# Patient Record
Sex: MALE | URBAN - METROPOLITAN AREA
[De-identification: names, ages, dates, MRNs, and addresses within clinical notes are randomized per-mention and may not be internally consistent; named-entity substitution may affect disease eponyms.]

---

## 2019-01-28 ENCOUNTER — HOSPITAL ENCOUNTER (INPATIENT)
Facility: HOSPITAL | Age: 69
LOS: 7 days | Discharge: NURSING, REHAB OR LONG-TERM CARE | DRG: 897 | End: 2019-02-04
Attending: PSYCHIATRY & NEUROLOGY | Admitting: PSYCHIATRY & NEUROLOGY
Payer: MEDICARE

## 2019-01-28 DIAGNOSIS — G20 PARKINSON DISEASE (HCC): ICD-10-CM

## 2019-01-28 DIAGNOSIS — F39 MOOD DISORDER (HCC): Primary | ICD-10-CM

## 2019-01-28 PROCEDURE — 1123F ACP DISCUSS/DSCN MKR DOCD: CPT | Performed by: PSYCHIATRY & NEUROLOGY

## 2019-01-28 PROCEDURE — 99222 1ST HOSP IP/OBS MODERATE 55: CPT | Performed by: PHYSICIAN ASSISTANT

## 2019-01-28 RX ORDER — CARBIDOPA/LEVODOPA 25MG-250MG
1 TABLET ORAL
Status: DISCONTINUED | OUTPATIENT
Start: 2019-01-28 | End: 2019-01-30

## 2019-01-28 RX ORDER — LORAZEPAM 1 MG/1
1 TABLET ORAL EVERY 4 HOURS PRN
Status: DISCONTINUED | OUTPATIENT
Start: 2019-01-28 | End: 2019-01-29

## 2019-01-28 RX ORDER — ASPIRIN 81 MG/1
81 TABLET ORAL DAILY
Status: DISCONTINUED | OUTPATIENT
Start: 2019-01-29 | End: 2019-02-04 | Stop reason: HOSPADM

## 2019-01-28 RX ORDER — ACETAMINOPHEN 325 MG/1
650 TABLET ORAL EVERY 4 HOURS PRN
Status: DISCONTINUED | OUTPATIENT
Start: 2019-01-28 | End: 2019-01-31

## 2019-01-28 RX ORDER — OLANZAPINE 10 MG/1
10 INJECTION, POWDER, LYOPHILIZED, FOR SOLUTION INTRAMUSCULAR 2 TIMES DAILY PRN
Status: DISCONTINUED | OUTPATIENT
Start: 2019-01-28 | End: 2019-01-29

## 2019-01-28 RX ORDER — CARBIDOPA/LEVODOPA 25MG-250MG
1 TABLET ORAL
Status: ON HOLD | COMMUNITY
End: 2019-01-30 | Stop reason: CLARIF

## 2019-01-28 RX ORDER — ENTACAPONE 200 MG/1
200 TABLET ORAL
Status: ON HOLD | COMMUNITY
End: 2019-01-30 | Stop reason: CLARIF

## 2019-01-28 RX ORDER — BENZTROPINE MESYLATE 1 MG/1
1 TABLET ORAL EVERY 6 HOURS PRN
Status: DISCONTINUED | OUTPATIENT
Start: 2019-01-28 | End: 2019-02-04 | Stop reason: HOSPADM

## 2019-01-28 RX ORDER — ENTACAPONE 200 MG/1
200 TABLET ORAL
Status: DISCONTINUED | OUTPATIENT
Start: 2019-01-28 | End: 2019-01-30

## 2019-01-28 RX ORDER — ASPIRIN 81 MG/1
81 TABLET ORAL DAILY
COMMUNITY

## 2019-01-28 RX ORDER — RISPERIDONE 1 MG/1
1 TABLET, FILM COATED ORAL EVERY 8 HOURS PRN
Status: DISCONTINUED | OUTPATIENT
Start: 2019-01-28 | End: 2019-01-29

## 2019-01-28 RX ORDER — HALOPERIDOL 5 MG
5 TABLET ORAL EVERY 8 HOURS PRN
Status: DISCONTINUED | OUTPATIENT
Start: 2019-01-28 | End: 2019-01-29

## 2019-01-28 RX ORDER — BENZTROPINE MESYLATE 1 MG/ML
1 INJECTION INTRAMUSCULAR; INTRAVENOUS EVERY 6 HOURS PRN
Status: DISCONTINUED | OUTPATIENT
Start: 2019-01-28 | End: 2019-02-04 | Stop reason: HOSPADM

## 2019-01-28 RX ORDER — TRAZODONE HYDROCHLORIDE 50 MG/1
25 TABLET ORAL
Status: DISCONTINUED | OUTPATIENT
Start: 2019-01-28 | End: 2019-02-04 | Stop reason: HOSPADM

## 2019-01-28 RX ORDER — MAGNESIUM HYDROXIDE/ALUMINUM HYDROXICE/SIMETHICONE 120; 1200; 1200 MG/30ML; MG/30ML; MG/30ML
30 SUSPENSION ORAL EVERY 4 HOURS PRN
Status: DISCONTINUED | OUTPATIENT
Start: 2019-01-28 | End: 2019-02-04 | Stop reason: HOSPADM

## 2019-01-28 RX ADMIN — CARBIDOPA AND LEVODOPA 1 TABLET: 25; 250 TABLET ORAL at 19:50

## 2019-01-28 RX ADMIN — OLANZAPINE 10 MG: 10 INJECTION, POWDER, FOR SOLUTION INTRAMUSCULAR at 22:04

## 2019-01-28 RX ADMIN — LORAZEPAM 1 MG: 1 TABLET ORAL at 23:25

## 2019-01-28 RX ADMIN — ACETAMINOPHEN 650 MG: 325 TABLET ORAL at 19:27

## 2019-01-28 RX ADMIN — TRAZODONE HYDROCHLORIDE 25 MG: 50 TABLET ORAL at 23:24

## 2019-01-28 NOTE — PROGRESS NOTES
Pt is a 201 admission from [facility]  Pt admitted for increased anxiety, depression, SI, paranoid delusions that he is poisoned by staff  Pt states he told a worker at [facility] that "if you had Parkinson's you would want to be dead too"  Staff reported that pt threatened to stab himself in the chest with a knife  Pt denies SI, depression on admission, he does report anxiety  Pt currently lives at [facility], he was admitted there due to concerns he was overtaking his Sinement  Pt has a PMH of CAD, Depression, Syphilis, Back pain, HTN, Parkinson's disease, hernia repair and mitral valve prolapse

## 2019-01-28 NOTE — PLAN OF CARE
Anxiety     Anxiety is at manageable level Progressing        Risk for Self Injury/Neglect     Treatment Goal: Remain safe during length of stay, learn and adopt new coping skills, and be free of self-injurious ideation, impulses and acts at the time of discharge Progressing

## 2019-01-29 PROBLEM — R45.851 SUICIDAL IDEATION: Status: ACTIVE | Noted: 2019-01-29

## 2019-01-29 PROBLEM — F19.959 SUBSTANCE OR MEDICATION-INDUCED PSYCHOTIC DISORDER (HCC): Status: ACTIVE | Noted: 2019-01-29

## 2019-01-29 LAB
ATRIAL RATE: 80 BPM
CHOLEST SERPL-MCNC: 221 MG/DL
EST. AVERAGE GLUCOSE BLD GHB EST-MCNC: 117 MG/DL
HBA1C MFR BLD: 5.7 % (ref 4.2–6.3)
HDLC SERPL-MCNC: 63 MG/DL (ref 40–59)
LDLC SERPL CALC-MCNC: 143 MG/DL
NONHDLC SERPL-MCNC: 158 MG/DL
P AXIS: 58 DEGREES
PLATELET # BLD AUTO: 181 THOUSANDS/UL (ref 150–450)
PR INTERVAL: 172 MS
QRS AXIS: 5 DEGREES
QRSD INTERVAL: 92 MS
QT INTERVAL: 386 MS
QTC INTERVAL: 445 MS
T WAVE AXIS: 20 DEGREES
TRIGL SERPL-MCNC: 75 MG/DL
TSH SERPL DL<=0.05 MIU/L-ACNC: 3.76 UIU/ML (ref 0.47–4.68)
VENTRICULAR RATE: 80 BPM

## 2019-01-29 PROCEDURE — 85049 AUTOMATED PLATELET COUNT: CPT | Performed by: PHYSICIAN ASSISTANT

## 2019-01-29 PROCEDURE — 93010 ELECTROCARDIOGRAM REPORT: CPT | Performed by: INTERNAL MEDICINE

## 2019-01-29 PROCEDURE — 93005 ELECTROCARDIOGRAM TRACING: CPT

## 2019-01-29 PROCEDURE — 80061 LIPID PANEL: CPT | Performed by: PHYSICIAN ASSISTANT

## 2019-01-29 PROCEDURE — 83036 HEMOGLOBIN GLYCOSYLATED A1C: CPT | Performed by: PHYSICIAN ASSISTANT

## 2019-01-29 PROCEDURE — 84443 ASSAY THYROID STIM HORMONE: CPT | Performed by: PHYSICIAN ASSISTANT

## 2019-01-29 PROCEDURE — 99222 1ST HOSP IP/OBS MODERATE 55: CPT | Performed by: PSYCHIATRY & NEUROLOGY

## 2019-01-29 RX ORDER — CHLORPROMAZINE HYDROCHLORIDE 25 MG/ML
25 INJECTION INTRAMUSCULAR EVERY 2 HOUR PRN
Status: DISCONTINUED | OUTPATIENT
Start: 2019-01-29 | End: 2019-02-04 | Stop reason: HOSPADM

## 2019-01-29 RX ADMIN — RISPERIDONE 1 MG: 1 TABLET ORAL at 02:50

## 2019-01-29 RX ADMIN — CARBIDOPA AND LEVODOPA 1 TABLET: 25; 250 TABLET ORAL at 14:37

## 2019-01-29 RX ADMIN — CARBIDOPA AND LEVODOPA 1 TABLET: 25; 250 TABLET ORAL at 11:29

## 2019-01-29 RX ADMIN — CARBIDOPA AND LEVODOPA 1 TABLET: 25; 250 TABLET ORAL at 06:22

## 2019-01-29 RX ADMIN — OLANZAPINE 10 MG: 10 INJECTION, POWDER, FOR SOLUTION INTRAMUSCULAR at 04:38

## 2019-01-29 RX ADMIN — WATER 10 ML: 1 INJECTION INTRAMUSCULAR; INTRAVENOUS; SUBCUTANEOUS at 04:42

## 2019-01-29 RX ADMIN — CARBIDOPA AND LEVODOPA 1 TABLET: 25; 250 TABLET ORAL at 17:29

## 2019-01-29 RX ADMIN — CARBIDOPA AND LEVODOPA 1 TABLET: 25; 250 TABLET ORAL at 01:11

## 2019-01-29 RX ADMIN — ENTACAPONE 200 MG: 200 TABLET, FILM COATED ORAL at 01:15

## 2019-01-29 RX ADMIN — ENTACAPONE 200 MG: 200 TABLET, FILM COATED ORAL at 06:22

## 2019-01-29 NOTE — PLAN OF CARE
Problem: Ineffective Coping  Goal: Participates in unit activities  Interventions:  - Provide therapeutic environment   - Provide required programming   - Redirect inappropriate behaviors    Outcome: Not Progressing  Pt not engaged in groups today

## 2019-01-29 NOTE — PROGRESS NOTES
Patient in chair in small TV room with staff present  Got up out of chair and and fell backward on bottom  Patient stood without assistance and walked to chair  Modesto EDMOND notified and on unit to assess patient
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
## 2019-01-29 NOTE — CASE MANAGEMENT
Pt comes to us from [facility] [address]  [phone]  I have called them to ask  if pt has any family that is involved; a POA; is he a resident of that site and is he welcome to return? I will await their call back

## 2019-01-29 NOTE — UTILIZATION REVIEW
I called and spoke with Will at [facility] at [phone] and advised them of patient's arrival on unit  Indicated we were not showing in their system, that an authorization number wouldn't show until discharge since Medicare/Medicaid is primary

## 2019-01-29 NOTE — CASE MANAGEMENT
Received a call from Robert Guo, the  at [facility] facility  [phone] and she states this pt is welcome to return once psychiatrically stable  Met with pt and he signed the IMM rights and his treatment plan  He states he wants to return to [facility]  He signed J[facility]ON for [facility], [employer]  Pt reports he is , has two sons- Carole Vizcarra and Zohra Rico- but could not elaborate any further  His speech is garbled, but he was able to state the correct date

## 2019-01-29 NOTE — CONSULTS
Consult- Torin Paredes 1950, 80 y o  male MRN: [id number]    Unit/Bed#: [facility] 355-74 Encounter: [id number]    Primary Care Provider: No primary care provider on file  Date and time admitted to hospital: 1/28/2019  5:03 PM      Inpatient consult for Medical Clearance for [address] patient  Consult performed by: Demarcus Hutton ordered by: Trenia Holy        Suicidal ideation   Assessment & Plan    · Patient is a transfer from [facility] due to increasing suicidal ideation, paranoia, anxiety and depression  Made comments that he would stab himself in the chest with a knife  Denies further SI currently but is agitated following the admission process  · Vital signs stable at time of admission  · Lab work results obtained from paper work sent with patient as information not available through EPIC  · CBC and CMP WNL  · UA negative  · UDS negative  · Did not see EKG with documentation - will order for AM as well as TSH  · Plan per psychiatry     Parkinson disease [facility])   Assessment & Plan    · Continue caribdopa-levodopa, administer with entacapone  · Fall precautions         VTE Prophylaxis: Enoxaparin (Lovenox)  / reason for no mechanical VTE prophylaxis Psychiatric unit, ambulate     Recommendations for Discharge:  · Follow-up with PCP at time of discharge    Counseling / Coordination of Care Time: 30 minutes  Greater than 50% of total time spent on patient counseling and coordination of care  Collaboration of Care: Were Recommendations Directly Discussed with Primary Treatment Team? - No     History of Present Illness:    Torin Paredes is a 80 y o  male who is originally admitted to the psychiatry service due to suicidal ideations  We are consulted for medical clearance  Past medical history significant for Parkinson's disease and mood disorder  Patient is a poor historian, and is agitated during encounter    Is here on a 201, but is adamant that he does not belong here and that nobody can even do basic comprehension here"  Is irritated that the string had to be removed from his sweat pants, and fixates on that during conversation  Current denies any physical complaints including chest pain/palpitations, shortness of breath, nausea/vomiting, or abdominal pain  Denies any current suicidal ideations  Review of Systems:    Review of Systems   Constitutional: Negative for chills, fatigue, fever and unexpected weight change  HENT: Negative for congestion, sore throat and trouble swallowing  Eyes: Negative for photophobia, pain and visual disturbance  Respiratory: Negative for cough, shortness of breath and wheezing  Cardiovascular: Negative for chest pain, palpitations and leg swelling  Gastrointestinal: Negative for abdominal pain, constipation, diarrhea, nausea and vomiting  Endocrine: Negative for polyuria  Genitourinary: Negative for difficulty urinating, dysuria, flank pain, hematuria and urgency  Musculoskeletal: Negative for back pain, myalgias, neck pain and neck stiffness  Skin: Negative for pallor and rash  Neurological: Negative for dizziness, tremors, syncope, speech difficulty, weakness, light-headedness and headaches  Hematological: Does not bruise/bleed easily  Psychiatric/Behavioral: Positive for agitation and decreased concentration  Negative for confusion  Past Medical and Surgical History:     Past Medical History:   Diagnosis Date    Mitral valve prolapse     Parkinson's disease ([facility] [address] )        No past surgical history on file  Meds/Allergies:    all medications and allergies reviewed    Allergies: No Known Allergies    Social History:     Marital Status: Unknown    Substance Use History:   History   Alcohol Use No     History   Smoking Status    Former Smoker    Packs/day: 1 00    Quit date: 1/1/2018   Smokeless Tobacco    Never Used     History   Drug Use    Types: Heroin       Family History:    History reviewed   No pertinent family history  Physical Exam:     Vitals:   Blood Pressure: 152/66 (01/28/19 2214)  Pulse: 79 (01/28/19 2214)  Temperature: (!) 97 1 °F (36 2 °C) (01/28/19 2214)  Temp Source: Tympanic (01/28/19 2214)  Respirations: 18 (01/28/19 2214)  Height: 5' 9" (175 3 cm) (01/28/19 1716)  Weight - Scale: 71 kg (156 lb 8 4 oz) (01/28/19 1716)  SpO2: 98 % (01/28/19 2214)    Physical Exam   Constitutional: He is oriented to person, place, and time  Vital signs are normal  He appears well-developed  Masked facies secondary Parkinson's disease   HENT:   Head: Normocephalic  Eyes: Pupils are equal, round, and reactive to light  EOM are normal  No scleral icterus  Neck: Normal range of motion  Cardiovascular: Normal rate, regular rhythm and normal heart sounds  No murmur heard  Pulmonary/Chest: Effort normal and breath sounds normal  No respiratory distress  Abdominal: Soft  Bowel sounds are normal  There is no tenderness  Musculoskeletal: He exhibits no edema  Neurological: He is alert and oriented to person, place, and time  Skin: Skin is warm and dry  Psychiatric: His affect is blunt  His speech is tangential  He is agitated  Cognition and memory are impaired  Nursing note and vitals reviewed  Additional Data:     Lab Results: I have personally reviewed pertinent reports  No results found for: HGBA1C            Imaging: I have personally reviewed pertinent reports  No orders to display       EKG, Pathology, and Other Studies Reviewed on Admission:   · EKG:  Obtain an a m     ** Please Note: This note has been constructed using a voice recognition system   **

## 2019-01-29 NOTE — PROGRESS NOTES
Pt currently sleeping in bed with bed alarm in place  Medications held at this time secondary to pt sleeping  Will continue to monitor
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
## 2019-01-29 NOTE — PROGRESS NOTES
Patient unable to sleep  Sitting in chair in hallway, restless; picking in air; attempting to get up  Agitation and aggression improved

## 2019-01-29 NOTE — PROGRESS NOTES
Patient irritable and agitated  Aggressive toward staff; attempting to get out of chair and kick staff  Swinging at staff and trying to grab staff  Medicated with prn zyprexa

## 2019-01-29 NOTE — PROGRESS NOTES
Restless, impulsive, poor safety awareness  Unsteady gait  Attempted verbal redirection, unsuccessful  Placed on 1:1 observation while awake for safety at this time secondary to high fall risk and previous fall this admission  Will continue to monitor and attempt redirection

## 2019-01-29 NOTE — PROGRESS NOTES
Patient restless any yelling out  Saying he wants to be in the ground with his wife and son; Asking if anyone here does assisted suicide  States he is not tired and does not want to sleep  Medicated with trazodone and ativan but continues to yell out, shake side rails and tries to get OOB

## 2019-01-29 NOTE — ASSESSMENT & PLAN NOTE
· Patient is a transfer from [facility] due to increasing suicidal ideation, paranoia, anxiety and depression  Made comments that he would stab himself in the chest with a knife  Denies further SI currently but is agitated following the admission process  · Vital signs stable at time of admission     · Lab work results obtained from paper work sent with patient as information not available through EPIC  · CBC and CMP WNL  · UA negative  · UDS negative  · Did not see EKG with documentation - will order for AM as well as TSH  · Plan per psychiatry

## 2019-01-29 NOTE — PROGRESS NOTES
Notified per nursing that patient had witnessed fall  Patient had been agitated throughout the evening, MHT had witnessed fall  Appears to be mechanical fall as patient is unsteady on his feet, does not ambulate with any assisted devices at this time  Extremely irritable and overall making physical examination limited  Patient reportedly fell backwards on to his buttocks, did not hit his head  Is not on a blood thinner  Currently denying any and all pain; grows more agitated with questioning  Was able to ambulate to chair near nursing station after fall without particular difficulty but is unsteady on his feet  Vitals:  Stable from prior    Physical Exam:  · General:  Appears in no acute medical distress, is very agitated and aggressive  Becomes more agitated with further questioning  Is awake and alert  · HEENT:  Head normocephalic, atraumatic  PERRLA  · Cardiac:  RRR, no murmurs rubs gallops  · Resp:  Clear to auscultation bilaterally  · Abd: non-tender to palpation  · Msk:  Limited due to patient's agitation, not willing to participate in a full examination  Denies any pain, is able to move both lower extremities without difficulty  Patient had been able to get up off the floor without assistance and walk to the chair after the fall  Refuses skin assessment    · Neuro:  Grossly intact, unsteady on feet which had been noted during admission process    Plan:  · Overall physical examination grossly normal; will hold off on imaging at this time unless patient becomes symptomatic   · Fall precautions, close observation  · Will have PT evaluate

## 2019-01-29 NOTE — PROGRESS NOTES
Patient in chair in small TV room, requested and given snack then assisted to bed  Patient restless, attempting to get OOB, unable to redirect  Risperdal ineffective

## 2019-01-30 PROCEDURE — 97530 THERAPEUTIC ACTIVITIES: CPT

## 2019-01-30 PROCEDURE — G8979 MOBILITY GOAL STATUS: HCPCS

## 2019-01-30 PROCEDURE — 97116 GAIT TRAINING THERAPY: CPT

## 2019-01-30 PROCEDURE — 97163 PT EVAL HIGH COMPLEX 45 MIN: CPT

## 2019-01-30 PROCEDURE — 99232 SBSQ HOSP IP/OBS MODERATE 35: CPT | Performed by: PSYCHIATRY & NEUROLOGY

## 2019-01-30 PROCEDURE — G8978 MOBILITY CURRENT STATUS: HCPCS

## 2019-01-30 RX ORDER — CARBIDOPA/LEVODOPA 25MG-250MG
1 TABLET ORAL 4 TIMES DAILY
Status: DISCONTINUED | OUTPATIENT
Start: 2019-01-30 | End: 2019-01-31

## 2019-01-30 RX ORDER — CARBIDOPA, LEVODOPA AND ENTACAPONE 37.5; 200; 15 MG/1; MG/1; MG/1
1 TABLET, FILM COATED ORAL 4 TIMES DAILY
COMMUNITY
End: 2019-02-04 | Stop reason: HOSPADM

## 2019-01-30 RX ADMIN — CARBIDOPA AND LEVODOPA 1 TABLET: 25; 250 TABLET ORAL at 15:45

## 2019-01-30 RX ADMIN — ACETAMINOPHEN 650 MG: 325 TABLET ORAL at 17:37

## 2019-01-30 RX ADMIN — CARBIDOPA AND LEVODOPA 1 TABLET: 25; 250 TABLET ORAL at 19:24

## 2019-01-30 RX ADMIN — ENTACAPONE 200 MG: 200 TABLET, FILM COATED ORAL at 06:24

## 2019-01-30 RX ADMIN — ENTACAPONE 200 MG: 200 TABLET, FILM COATED ORAL at 09:18

## 2019-01-30 RX ADMIN — CARBIDOPA AND LEVODOPA 1 TABLET: 25; 250 TABLET ORAL at 06:24

## 2019-01-30 RX ADMIN — CARBIDOPA AND LEVODOPA 1 TABLET: 25; 250 TABLET ORAL at 09:18

## 2019-01-30 RX ADMIN — ASPIRIN 81 MG: 81 TABLET, COATED ORAL at 09:18

## 2019-01-30 RX ADMIN — ACETAMINOPHEN 650 MG: 325 TABLET ORAL at 09:18

## 2019-01-30 NOTE — PROGRESS NOTES
Pt is cooperative with care and is medication compliant  Pt advised feeling better today  Pt brightens upon approach, is visible in the milieu and out for groups  Pt came to the nurses station, advised he was pleased with how much better he feels in a short time  Pt denies s/s at this time  Will continue to monitor

## 2019-01-30 NOTE — PHYSICAL THERAPY NOTE
Physical Therapy Evaluation and Treatment Note    Time In/Out: 8416-7379    Patient's Name: Lupis Hernandez    Admitting Diagnosis  Depression [F32 9]    Problem List  Patient Active Problem List   Diagnosis    Parkinson disease ([facility] )    Suicidal ideation    Substance or medication-induced psychotic disorder [facility])       Past Medical History  Past Medical History:   Diagnosis Date    Mitral valve prolapse     Parkinson's disease ([facility] )        Past Surgical History  No past surgical history on file  01/30/19 0059-9169   Note Type   Note type Eval/Treat   Pain Assessment   Pain Assessment 0-10   Pain Score 5   Pain Type Chronic pain   Pain Location Leg   Pain Orientation Bilateral   Pain Descriptors Tightness   Pain Frequency Constant/continuous   Pain Onset Ongoing   Clinical Progression Not changed   Hospital Pain Intervention(s) Medication (See MAR); Repositioned; Ambulation/increased activity   Home Living   Type of Home ([address] [facility])   Home Layout One level   Home Equipment Walker;Cane;Wheelchair-manual  (RW)   Prior Function   Level of Person Needs assistance with ADLs and functional mobility   Receives Help From ([address] [place] staff)   ADL Assistance Needs assistance   IADLs Needs assistance   Falls in the last 6 months 0   Vocational Retired   Restrictions/Precautions   [facility] Bearing Precautions Per Order No   Other Precautions Suicidal;Cognitive; Fall Risk   General   Family/Caregiver Present No   Cognition   Overall Cognitive Status Impaired   Arousal/Participation Alert   Orientation Level Oriented X4   Memory Decreased long term memory   Following Commands Follows one step commands without difficulty   RUE Assessment   RUE Assessment WFL   LUE Assessment   LUE Assessment WFL   RLE Assessment   RLE Assessment WFL   LLE Assessment   LLE Assessment WFL   Coordination   Movements are Fluid and Coordinated 0   Coordination and Movement Description (freezing gait)   Sensation WFL   Light Touch   RLE Light Touch Grossly intact   LLE Light Touch Grossly intact   Bed Mobility   Rolling R 5  Supervision   Additional items Verbal cues   Rolling L 5  Supervision   Additional items Verbal cues   Supine to Sit 5  Supervision   Additional items Increased time required;Verbal cues   Sit to Supine 5  Supervision   Additional items Increased time required;Verbal cues   Transfers   Sit to Stand 5  Supervision   Additional items Armrests; Increased time required;Verbal cues   Stand to Sit 5  Supervision   Additional items Armrests; Increased time required;Verbal cues   Stand pivot 5  Supervision   Additional items Armrests; Increased time required;Verbal cues  (RW)   Additional Comments (Transfer surfaces: edge of bed, armchair)   Ambulation/Elevation   Gait pattern Forward Flexion;Decreased foot clearance;Shuffling; Short stride  (shuffling gait with freezing in doorways & narrow spaces)   Gait Assistance 5  Supervision   Additional items Verbal cues; Tactile cues   Assistive Device Rolling walker   Distance (~ 200 feet x 1)   Balance   Static Sitting Good   Dynamic Sitting (Good -)   Static Standing Fair +  (with RW)   Dynamic Standing Fair  (with RW)   Ambulatory Fair  (with RW)   Endurance Deficit   Endurance Deficit Yes   Activity Tolerance   Activity Tolerance Patient tolerated treatment well;Patient limited by fatigue  (pt c/o brain fog)   Nurse Made Aware (RN cleared for evaluation)   Assessment   Prognosis Good   Problem List Impaired balance;Decreased mobility; Decreased coordination;Decreased cognition; Impaired judgement;Decreased safety awareness;Pain   Assessment Faby Dale is a 80 y o  Male who was admitted to [address] [place] [address] on 1/28/19 with a diagnosis of substance or medication-induced psychotic disorder  Prior to [facility] to [facility], pt was admitted to [address] for long-term care on 1/11/19 and previously lived at a similar facility  Per chart, his gait was very limited at baseline   PMH significant for Parkinson's disease, and suicidal ideation  Pt seated in community room at time of evaluation  Pt presents with stooped/forward flexed posture, masked face (pt noted that his mouth is always open), and bilateral UE and LE resting tremor  Pt noted to have multiple freezing episodes with sit <-> stand transfer, ambulation through doorways and narrow spaces, turning, and with bed mobility impacting pt's functional mobility and safety  External verbal cues to increase step length with ambulation and tactile cues/rocking in place decreased shuffling/freezing, but internal cueing was ineffective  Vitals: resting in chair /70, HR 67 bpm, SPO2 (RA) 98%; seated after 200' x 1 ambulation with RW /79, HR 69 bpm, SPO2 (RA) 99%, seated edge of bed after short rest /77, HR 69 bpm, SPO2 (RA) 98%  Pt would benefit from skilled physical therapy services to address mobility deficits related to Parkinson's disease and improve pt's safety in prep for return to nursing home  In summary the patient's history, examination of body system(s), activity limitations, participation restrictions, and collaboration of care are the guiding factors that were used to determine the clinical descion  The clinical presentation is unstable and therefore the assigned level of complexity is: high  Barriers to Discharge (none if returning to [facility])   Goals   Patient Goals ("To get better  ")   STG Expiration Date 02/13/19   Short Term Goal #1 1  Pt will perform all bed mobility mod I using compensatory techniques to decrease freezing episodes in order to manage at home  2  Pt will perform all functional transfers mod I with RW using compensatory techniques to decrease freezing episodes in order to safely transfer from one surface to another  3  Pt will ambulate > or = 200 feet with least restrictive AD using compensatory techniques/verbal cues to decrease freezing and shuffling gait to allow pt to safely ambulate within home 4   Pt will perform 180 and 360 turns with RW, CGA, and compensatory techniques/verbal cues to decrease shuffling gait with turns and increase safety with ambulation  Treatment Day 1   Plan   Treatment/Interventions Functional transfer training; Therapeutic exercise; Endurance training;Cognitive reorientation;Patient/family training;Equipment eval/education; Bed mobility;Gait training; Compensatory technique education;Continued evaluation;Spoke to MD;Spoke to nursing;Spoke to case management;Spoke to advanced practitioner;Family   PT Frequency 2-3x/wk   Recommendation   Recommendation Long-term skilled nursing home placement   Equipment Recommended (pt appears to own all necessary DME)   PT - OK to Discharge Yes  (if returning to SNF)   Barthel Index   Feeding 10   Bathing 0   Grooming Score 5   Dressing Score 5   Bladder Score 5   Bowels Score 10   Toilet Use Score 5   Transfers (Bed/Chair) Score 10   Mobility (Level Surface) Score 10   Stairs Score 0   Barthel Index Score 60     ________________________________________________________    PT Treatment Note    Time In/Out: 9551-0395    S: Pt stated that he is moving better today but had a lot of difficulty 2 days ago  O: Gait training in hallway using auditory cues to facilitate increased step length/decrease shuffling  Sit <-> stand transfer training with verbal/tactile cues and therapist demonstration to decrease freezing with sit to stand and improve eccentric control with stand to sit  A: Pt presents with postural instability,impaired balance, shuffling gait, and freezing episodes  Would benefit from continued skilled PT while on OABHU to improve functional mobility and maximize safety  P: Continue skilled PT per POC        Magali Saldaña, PT

## 2019-01-30 NOTE — PLAN OF CARE
Problem: Ineffective Coping  Goal: Participates in unit activities  Interventions:  - Provide therapeutic environment   - Provide required programming   - Redirect inappropriate behaviors    Outcome: Progressing  Pt attended both morning groups with active task engagement and expressed appreciation for the sessions and for feeling progressively better today

## 2019-01-30 NOTE — PLAN OF CARE
Problem: PHYSICAL THERAPY ADULT  Goal: Performs mobility at highest level of function for planned discharge setting  See evaluation for individualized goals  Treatment/Interventions: Functional transfer training, Therapeutic exercise, Endurance training, Cognitive reorientation, Patient/family training, Equipment eval/education, Bed mobility, Gait training, Compensatory technique education, Continued evaluation, Spoke to MD, Spoke to nursing, Spoke to case management, Spoke to advanced practitioner, Family  Equipment Recommended:  (pt appears to own all necessary DME)       See flowsheet documentation for full assessment, interventions and recommendations  Outcome: Progressing  Prognosis: Good  Problem List: Impaired balance, Decreased mobility, Decreased coordination, Decreased cognition, Impaired judgement, Decreased safety awareness, Pain  Assessment: Elly Garay is a 80 y o  Male who was admitted to [address] [place] [address] on 1/28/19 with a diagnosis of substance or medication-induced psychotic disorder  Prior to admission to [facility], pt was admitted to [address] for long-term care on 1/11/19 and previously lived at a similar facility  Per chart, his gait was very limited at baseline  PMH significant for Parkinson's disease, and suicidal ideation  Pt seated in community room at time of evaluation  Pt presents with stooped/forward flexed posture, masked face (pt noted that his mouth is always open), and bilateral UE and LE resting tremor  Pt noted to have multiple freezing episodes with sit <-> stand transfer, ambulation through doorways and narrow spaces, turning, and with bed mobility impacting pt's functional mobility and safety  External verbal cues to increase step length with ambulation and tactile cues/rocking in place decreased shuffling/freezing, but internal cueing was ineffective   Vitals: resting in chair /70, HR 67 bpm, SPO2 (RA) 98%; seated after 200' x 1 ambulation with RW /79, HR 69 bpm, SPO2 (RA) 99%, seated edge of bed after short rest /77, HR 69 bpm, SPO2 (RA) 98%  Pt would benefit from skilled physical therapy services to address mobility deficits related to Parkinson's disease and improve pt's safety in prep for return to nursing home  In summary the patient's history, examination of body system(s), activity limitations, participation restrictions, and collaboration of care are the guiding factors that were used to determine the clinical descion  The clinical presentation is unstable and therefore the assigned level of complexity is: high  Barriers to Discharge:  (none if returning to [facility])     Recommendation: Long-term skilled nursing home placement     PT - OK to Discharge: Yes (if returning to SNF)    See flowsheet documentation for full assessment

## 2019-01-30 NOTE — PROGRESS NOTES
Writer called patient's outpatient neurologist Dr Lachelle Quesada with [facility] in [place]  No return call  Discussed case with Dr David Bourne in neurology on the consult service here recommended discontinuing entacapone and continue sinemet 25-250mg five times daily  Will continue to try to reach out to Dr Lachelle Quesada in the meantime

## 2019-01-30 NOTE — PROGRESS NOTES
[facility] Inpatient Geriatric Psychiatry  Psychiatrists  Progress Note    Patient Name: Ivet Burleson  MRN: [id number]  DOS: 01/30/19     Chief Complaint:  psychosis    Interval History: Per staff, patient patient been less sedated, ambulating well, calm, cooperative  Patient reports feeling better  He is much less dysarthric  He is well oriented  He explained that he has been at the current NH for only about 2 weeks  Prior to that he was at another NH for 2 weeks  He states he could not longer take care of himself with his parkinson disease after his wife passed away  He believes he should continue to live in the current facility for the time being  He is focused on increasing sinemet, wanting to take it every 2 hours or so  He states he was recently started on a combination medication with entacapone but that this has made him feel "foggy " He acknowledges the events and his behavior leading up to this admission are due to the medication change  Writer spoke with a nurse at the NH today who corroborates patient's report  She states he was on levodopa-carbidopa 25-250mg po TID from 1/12/19-1/23/19  He then was seen in neurology clinic (Dr Serenity Otto at [facility]) and was switched to levodopa-carbidopa-entacapone 37 [id number] po QID starting 1/25/19  Additionally, he was started on seroquel 25mg po qhs 1/16/19 for "restlessness" by a psychiatrist at the [facility] [place] but patient started refusing after a few days stating neurologists have told him not to take this medication  It should be noted that patient's current regimen of levodopa-carbidopa 25-250mg po five times daily and entacapone 200mg five times daily was started by the hospitalist service based on the home med list available at the time of admission  It's unclear how this was reconciled   Nevertheless, we have had no recurrence of psychosis, suicidal thoughts or agitation and patient is ambulating moderate well with a walker  Medication compliant  Adverse effects of medications: denies      Mental Status Exam [Per above +]  Appearance: some overgrown facial hair; dressed in hospital gown; fair hygiene; no tremors noted; moderately stable gait with walker; mild shuffling and truncal stiffness  Behavior: calm, cooperative, attentive  Speech: very mildly dysarthric  Mood: reported as euthymic  Affect: neutral, stable, somewhat constricted  Thought process: linear, largely logical  Thought content: no delusions elicited; denies SI/HI  Perceptual disturbances: denies AH/VH  Cognition: oriented to all domains   No gross cognitive deficits based on his ability to recall medication names, recent events consistent with collateral information  Insight: intact  Judgement: improved      Current Facility-Administered Medications:     acetaminophen (TYLENOL) tablet 650 mg, 650 mg, Oral, Q4H PRN, Myra Lester MD, 650 mg at 01/30/19 0918    aluminum-magnesium hydroxide-simethicone (MYLANTA) 200-200-20 mg/5 mL oral suspension 30 mL, 30 mL, Oral, Q4H PRN, Myra Lester MD    aspirin (ECOTRIN LOW STRENGTH) EC tablet 81 mg, 81 mg, Oral, Daily, Joe Leblanc PA-C, 81 mg at 01/30/19 0441    benztropine (COGENTIN) injection 1 mg, 1 mg, Intramuscular, Q6H PRN, Myra Lester MD    benztropine (COGENTIN) tablet 1 mg, 1 mg, Oral, Q6H PRN, Myra Lester MD    carbidopa-levodopa (SINEMET)  mg per tablet 1 tablet, 1 tablet, Oral, 5x Daily, Parviz Carreno PA-C, 1 tablet at 01/30/19 2004    chlorproMAZINE (THORAZINE) injection SOLN 25 mg, 25 mg, Intramuscular, Q2H PRN, Supriya Santiago MD    enoxaparin (LOVENOX) subcutaneous injection 40 mg, 40 mg, Subcutaneous, Q24H [address], Joe Leblanc PA-C    entacapone (COMTAN) tablet 200 mg, 200 mg, Oral, 5x Daily, Joe Leblanc PA-C, 200 mg at 01/30/19 3316    magnesium hydroxide (MILK OF MAGNESIA) 400 mg/5 mL oral suspension 30 mL, 30 mL, Oral, Daily PRN, Fabio Herrera MD    nicotine polacrilex (NICORETTE) gum 2 mg, 2 mg, Oral, Q2H PRN, Fabio Herrera MD    traZODone (DESYREL) tablet 25 mg, 25 mg, Oral, HS PRN, Fabio Herrera MD, 25 mg at 01/28/19 2324    Vitals:    01/30/19 0724   BP: 130/60   Pulse: 72   Resp: 16   Temp: (!) 97 2 °F (36 2 °C)   SpO2: 96%       Lab/work up: none    Assessment:     Axis I:  · Medication induced psychotic d/o - resolving     Axis II:  · deferred  Axis III:  · Parkinson disease  Axis IV:  · Limited social support; lives at [facility]      Progress: improved - no psychosis or SI    Plan:   1  Disposition: Patient will be discharged in 1-2 days after sustained improvement established and treatment discussed with neurology  2  Psychopharmacologic interventions:  - no psychotropic medications indicated at this time   - Continue to monitor for paranoia, SI  3  Medical comorbidities: Hospitalist assessment appreciated  - paged neurology consult service via  today to get recommendations on sinemet and entacapone dosing prior to discharge  4  Other therapies: Individual/group/milieu therapy as appropriate   5  Social issues: Case management following to make discharge arrangement  6   Precautions: Routine q15min checks, vitals qshift; fall risk    Oly Bueno MD  01/30/19

## 2019-01-30 NOTE — PROGRESS NOTES
Pt calm and cooperative  Pt seen in the milieu at meal time  No association with peers noted  Pt observed in bed sleeping comfortably at this time

## 2019-01-31 PROCEDURE — 99232 SBSQ HOSP IP/OBS MODERATE 35: CPT | Performed by: PSYCHIATRY & NEUROLOGY

## 2019-01-31 PROCEDURE — 97110 THERAPEUTIC EXERCISES: CPT

## 2019-01-31 PROCEDURE — 97116 GAIT TRAINING THERAPY: CPT

## 2019-01-31 PROCEDURE — 97530 THERAPEUTIC ACTIVITIES: CPT

## 2019-01-31 RX ORDER — CARBIDOPA/LEVODOPA 25MG-250MG
0.5 TABLET ORAL 4 TIMES DAILY
Status: DISCONTINUED | OUTPATIENT
Start: 2019-01-31 | End: 2019-02-01

## 2019-01-31 RX ORDER — ACETAMINOPHEN 325 MG/1
975 TABLET ORAL ONCE
Status: COMPLETED | OUTPATIENT
Start: 2019-01-31 | End: 2019-01-31

## 2019-01-31 RX ORDER — ACETAMINOPHEN 325 MG/1
650 TABLET ORAL EVERY 4 HOURS PRN
Status: DISCONTINUED | OUTPATIENT
Start: 2019-01-31 | End: 2019-02-04 | Stop reason: HOSPADM

## 2019-01-31 RX ORDER — ENTACAPONE 200 MG/1
200 TABLET ORAL 4 TIMES DAILY
Status: DISCONTINUED | OUTPATIENT
Start: 2019-01-31 | End: 2019-02-01

## 2019-01-31 RX ORDER — MUSCLE RUB CREAM 100; 150 MG/G; MG/G
CREAM TOPICAL 4 TIMES DAILY PRN
Status: DISCONTINUED | OUTPATIENT
Start: 2019-01-31 | End: 2019-02-04 | Stop reason: HOSPADM

## 2019-01-31 RX ADMIN — CARBIDOPA AND LEVODOPA 0.5 TABLET: 25; 250 TABLET ORAL at 14:53

## 2019-01-31 RX ADMIN — ACETAMINOPHEN 975 MG: 325 TABLET ORAL at 13:38

## 2019-01-31 RX ADMIN — CARBIDOPA AND LEVODOPA 1 TABLET: 25; 250 TABLET ORAL at 12:16

## 2019-01-31 RX ADMIN — CARBIDOPA AND LEVODOPA 1 TABLET: 25; 250 TABLET ORAL at 06:14

## 2019-01-31 RX ADMIN — ENTACAPONE 200 MG: 200 TABLET, FILM COATED ORAL at 14:52

## 2019-01-31 RX ADMIN — ASPIRIN 81 MG: 81 TABLET, COATED ORAL at 08:26

## 2019-01-31 RX ADMIN — CARBIDOPA AND LEVODOPA 0.5 TABLET: 25; 250 TABLET ORAL at 19:51

## 2019-01-31 NOTE — PROGRESS NOTES
Pt is cooperative with care and present in the milieu  Pt is pleasant and selectively social with staff and peers  Pt denies s/s at this time  Will continue to monitor

## 2019-01-31 NOTE — PLAN OF CARE
Problem: Ineffective Coping  Goal: Participates in unit activities  Interventions:  - Provide therapeutic environment   - Provide required programming   - Redirect inappropriate behaviors    Outcome: [id number] and well focused in afternoon memory game  He discussed having had PT and was very interested in the use of music as a means toward smoother muscle movement when dealing with parkinson's disease  Pt prefers Beatles music to move to

## 2019-01-31 NOTE — PHYSICAL THERAPY NOTE
Physical Therapy Daily Treatment Note     01/31/19 9253-9805 (46 minutes)   Pain Assessment   Pain Assessment 0-10   Pain Score Worst Possible Pain   Pain Type Neuropathic pain   Pain Location Leg   Pain Descriptors Numbness   Pain Frequency Constant/continuous   Pain Onset Ongoing   Clinical Progression Not changed   Patient's Stated Pain Goal No pain   Hospital Pain Intervention(s) Medication (See MAR); Repositioned; Ambulation/increased activity   Restrictions/Precautions   Weight Bearing Precautions Per Order No   Other Precautions Suicidal;Cognitive; Fall Risk;Pain   General   Chart Reviewed Yes   Response to Previous Treatment Patient with no complaints from previous session  Family/Caregiver Present No   Cognition   Overall Cognitive Status Impaired   Arousal/Participation Cooperative   Attention Attends with cues to redirect   Orientation Level Oriented X4   Memory Decreased long term memory;Decreased short term memory   Following Commands Follows one step commands without difficulty   Subjective   Subjective ("I think too much   My doctor is going to start me on Comtan )   Bed Mobility   Rolling R 6  Modified independent   Additional items Verbal cues   Rolling L 6  Modified independent   Additional items Verbal cues   Supine to Sit 6  Modified independent   Additional items Verbal cues   Sit to Supine 6  Modified independent   Additional items Verbal cues   Additional Comments (HOB flat, no rails; pt uses mattress to pull himself to S/L)   Transfers   Sit to Stand 7  Independent   Stand to Sit 7  Independent   Stand pivot 5  Supervision   Additional items Increased time required;Verbal cues  (RW; freezing with SPT)   Ambulation/Elevation   Gait pattern Forward Flexion;Decreased foot clearance   Gait Assistance 5  Supervision  (CGA with backward walking)   Additional items Verbal cues  (counting steps, cueing "BIG" steps)   Assistive Device Rolling walker   Distance (ambulation around unit x 2, 25' x 6 fwd, 25' x 2 bkwd)   Balance   Ambulatory Fair +  (with RW)   Endurance Deficit   Endurance Deficit No   Activity Tolerance   Activity Tolerance Patient tolerated treatment well   Nurse Made Aware (RN clears for PT treatment)   Exercises   UE Exercise Standing;20 reps  (reaching & twisting side to side to increase trunk rotation,)   Squat (repeated STS from armless chair with overhead reach 2 x 10)   Assessment   Prognosis Good   Problem List Impaired balance;Decreased mobility; Decreased coordination;Decreased cognition; Impaired judgement;Decreased safety awareness;Pain   Assessment Pt eager to start physical therapy treatment this AM  Pt continues to present with stooped posture, rigidity, shuffling gait, and freezing episodes with transfers and ambulation  Treatment session focused on compensatory techniques to initiate movement, the use of auditory cues (rhythmic counting of steps) to increase step length with ambulation, and therex to decrease rigidity and improve posture  Pt able to ambulate approx 25' hallway with fewer, larger strides with cues from therapist and independent counting of steps  Continues to have difficulty navigating turns without freezing making him at increased risk of falling  Would benefit from continued skilled physical therapy while on OABHU to address mobility deficits and increase safety  Vitals: seated edge of bed at rest /82, HR 76 bpm; seated edge of bed post 250' x 1 ambulation /84, HR 78 bpm    Barriers to Discharge (none if returning to NH)   Goals   Patient Goals ("To beat Parkinson's ")   STG Expiration Date 02/13/19   Treatment Day 2   Plan   Treatment/Interventions Functional transfer training;LE strengthening/ROM; Therapeutic exercise; Endurance training;Patient/family training;Bed mobility;Gait training; Compensatory technique education;Spoke to nursing   Progress Progressing toward goals   PT Frequency 2-3x/wk   Recommendation   Recommendation Long-term skilled nursing home placement   Equipment Recommended (pt appears to own all necessary DME)   PT - OK to Discharge Yes  (if returning to SNF)     Bob Cloud, PT

## 2019-01-31 NOTE — PROGRESS NOTES
[facility] Inpatient Geriatric Psychiatry  Psychiatrists  Progress Note    Patient Name: Kip Andrade  MRN: [id number]  DOS: 01/31/19     Chief Complaint:  psychosis    Interval History: Per staff, patient has been calm, cooperative  He continues to have stable ambulation after entacapone discontinuation  He is mildly paranoid at this time  He states he is not walking well since sinemet dose reduced yesterday and tries to demonstrate it by walking during assessment but it's no poorer than yesterday  Writer spoke with his outpatient neurologist Dr Laurence Hernandez yesterday evening  He reports having seen the patient only once on 1/22/19 when patient established care at which point patient appeared to be somewhat hyperkinetic and mildly paranoid  He was switched from sinemet  tid to sinemet-entacapone combination of 37 5-150-200 QID  Dr Laurence Hernandez suggested giving sinemet 12 5-125mg QID and entacapone QID  He did warn that sinemet can have addictive potential, making reference to patient's preoccupation with increasing it to very large doses  Medication compliant  Adverse effects of medications: denies      Mental Status Exam [Per above +]  Appearance: fair grooming, intact hygiene; no tremors, gait remains stable relative to yesterday  Behavior: calm, cooperative; mild psychomotor retardation  Speech: wnl  Mood: anxious  Affect: limited by parkinsonsim  Thought process: largely linear  Thought content: preoccupied with sinemet dose increase; somewhat paranoid  Perceptual disturbances: denies AH/VH  Cognition: oriented to all domains     Insight: partially intact  Judgement: poorer today      Current Facility-Administered Medications:     acetaminophen (TYLENOL) tablet 650 mg, 650 mg, Oral, Q4H PRN, Jose Kelly MD, 650 mg at 01/30/19 1737    aluminum-magnesium hydroxide-simethicone (MYLANTA) 200-200-20 mg/5 mL oral suspension 30 mL, 30 mL, Oral, Q4H PRN, Nedra Davis MD    aspirin (ECOTRIN LOW STRENGTH) EC tablet 81 mg, 81 mg, Oral, Daily, Mine Leblanc PA-C, 81 mg at 01/31/19 7525    benztropine (COGENTIN) injection 1 mg, 1 mg, Intramuscular, Q6H PRN, Nedra Davis MD    benztropine (COGENTIN) tablet 1 mg, 1 mg, Oral, Q6H PRN, Nedra Davis MD    carbidopa-levodopa (SINEMET)  mg per tablet 1 tablet, 1 tablet, Oral, 4x Daily, Bradly Abrams MD, 1 tablet at 01/31/19 8832    chlorproMAZINE (THORAZINE) injection SOLN 25 mg, 25 mg, Intramuscular, Q2H PRN, Bradly Abrams MD    magnesium hydroxide (MILK OF MAGNESIA) 400 mg/5 mL oral suspension 30 mL, 30 mL, Oral, Daily PRN, Nedra Davis MD    nicotine polacrilex (NICORETTE) gum 2 mg, 2 mg, Oral, Q2H PRN, Nedra Davis MD    traZODone (DESYREL) tablet 25 mg, 25 mg, Oral, HS PRN, Nedra Davis MD, 25 mg at 01/28/19 2324    Vitals:    01/31/19 0741   BP: 112/56   Pulse: 67   Resp: 18   Temp: 97 5 °F (36 4 °C)   SpO2: 94%       Lab/work up: none    Assessment:     Axis I:  Medication induced psychotic d/o - fluctuating at this time     Axis II:  · deferred  Axis III:  · Parkinson disease  Axis IV:  · Limited social support; lives at [facility]      Progress: no SI but paranoia emerging    Plan:   1  Disposition: Patient will be discharged in 1-2 days after sustained improvement established and treatment discussed with neurology  2  Psychopharmacologic interventions:  - no psychotropic medications indicated at this time - will try to control paranoia by managing parkinson medications   - Continue to monitor for paranoia, SI  3  Medical comorbidities: Hospitalist assessment appreciated  - per outpatient neurologist, will add entacapone back at 200mg po QID and reduce sinemet to 12 5mg-125mg po QID  4  Other therapies: Individual/group/milieu therapy as appropriate   5  Social issues: Case management following to make discharge arrangement  6   Precautions: Routine q15min checks, vitals qshift; fall risk    Rosa Andrade MD  01/31/19

## 2019-01-31 NOTE — PROGRESS NOTES
Pt pleasant, calm and cooperative  Pt observed ambulating around the rouse with a walker  Pt denies s/s and medication complaint

## 2019-02-01 PROCEDURE — 99232 SBSQ HOSP IP/OBS MODERATE 35: CPT | Performed by: PSYCHIATRY & NEUROLOGY

## 2019-02-01 PROCEDURE — 97116 GAIT TRAINING THERAPY: CPT

## 2019-02-01 RX ORDER — CARBIDOPA/LEVODOPA 25MG-250MG
1 TABLET ORAL 4 TIMES DAILY
Status: DISCONTINUED | OUTPATIENT
Start: 2019-02-01 | End: 2019-02-04 | Stop reason: HOSPADM

## 2019-02-01 RX ADMIN — CARBIDOPA AND LEVODOPA 0.5 TABLET: 25; 250 TABLET ORAL at 06:28

## 2019-02-01 RX ADMIN — CARBIDOPA AND LEVODOPA 1 TABLET: 25; 250 TABLET ORAL at 17:39

## 2019-02-01 RX ADMIN — CARBIDOPA AND LEVODOPA 1 TABLET: 25; 250 TABLET ORAL at 11:40

## 2019-02-01 RX ADMIN — CARBIDOPA AND LEVODOPA 1 TABLET: 25; 250 TABLET ORAL at 21:06

## 2019-02-01 RX ADMIN — ASPIRIN 81 MG: 81 TABLET, COATED ORAL at 08:45

## 2019-02-01 NOTE — PLAN OF CARE
Anxiety     Anxiety is at manageable level Progressing        Prexisting or High Potential for Compromised Skin Integrity     Skin integrity is maintained or improved Progressing        Risk for Self Injury/Neglect     Treatment Goal: Remain safe during length of stay, learn and adopt new coping skills, and be free of self-injurious ideation, impulses and acts at the time of discharge Progressing

## 2019-02-01 NOTE — PROGRESS NOTES
[facility] Geriatric Psychiatry  Psychiatrists  Progress Note    Patient Name: Keiko Tovar  MRN: [id number]  DOS: 02/01/19     Chief Complaint:  psychosis    Interval History: Per staff, patient has been refusing entacapone  On assessment, patient states he does not want to take this as he has felt foggy and confused when he first started taking it 1/23/19 after being prescribed it by his neurologist  He remains preoccupied with increasing the sinemet but was accepting when writer set limits  No delusions noted today  No adverse events reported overnight  Medication noncompliant with entacapone       Adverse effects of medications: denies      Mental Status Exam [Per above +]  Appearance: adequate hygiene/grooming; no tremors but has more difficulty initiating movements today as he did not take the entacapone  Behavior: calm, cooperative, pleasant  Speech: mildly dysarthric  Mood: euthymic  Affect: neutral  Thought process: linear  Thought content: no delusions elicited; preoccupied with increasing sinemet; denies SI/HI  Perceptual disturbances: denies AH and does not appear to be internally preoccupied  Cognition:  oriented to all domains, above average intelligence based on vocabulary  Insight: partially intact  Judgement: improving      Current Facility-Administered Medications:     acetaminophen (TYLENOL) tablet 650 mg, 650 mg, Oral, Q4H PRN, Guillermo Ramirez MD    aluminum-magnesium hydroxide-simethicone (MYLANTA) 200-200-20 mg/5 mL oral suspension 30 mL, 30 mL, Oral, Q4H PRN, Nemo Kraus MD    aspirin (ECOTRIN LOW STRENGTH) EC tablet 81 mg, 81 mg, Oral, Daily, Darell Leblanc PA-C, 81 mg at 02/01/19 0845    benztropine (COGENTIN) injection 1 mg, 1 mg, Intramuscular, Q6H PRN, Nemo Kraus MD    benztropine (COGENTIN) tablet 1 mg, 1 mg, Oral, Q6H PRN, Nemo Kraus MD    carbidopa-levodopa (SINEMET)  mg per tablet 1 tablet, 1 tablet, Oral, 4x Daily, Franky Eden MD, 1 tablet at 02/01/19 1140    chlorproMAZINE (THORAZINE) injection SOLN 25 mg, 25 mg, Intramuscular, Q2H PRN, Franky Eden MD    magnesium hydroxide (MILK OF MAGNESIA) 400 mg/5 mL oral suspension 30 mL, 30 mL, Oral, Daily PRN, Nnai Reza MD    menthol-methyl salicylate (BENGAY) 54-94 % cream, , Apply externally, 4x Daily PRN, Max Delgado DO    nicotine polacrilex (NICORETTE) gum 2 mg, 2 mg, Oral, Q2H PRN, Nani Reza MD    traZODone (DESYREL) tablet 25 mg, 25 mg, Oral, HS PRN, Nani Reza MD, 25 mg at 01/28/19 2324    Vitals:    02/01/19 0730   BP: 167/77   Pulse: 71   Resp: 16   Temp: 98 °F (36 7 °C)   SpO2: 96%       Lab/work up: none    Assessment:     Axis I:  Medication induced psychotic d/o - fluctuating at this time     Axis II:  · deferred  Axis III:  · Parkinson disease  Axis IV:  · Limited social support; lives at [facility]      Progress: no paranoia today    Plan:   1  Disposition: Patient will be discharged Monday if psychosis does not reemerge after parkinson disease medications are stabilized  2  Psychopharmacologic interventions:  - no psychotropic medications indicated at this time - will try to control paranoia by managing parkinson medications   - Continue to monitor for paranoia, SI  3  Medical comorbidities: Hospitalist assessment appreciated  - outpatient neurologist recommended patient take entacapone 200mg po QID and sinemet 12 5mg-125mg po QID but since patient is refusing entacapone, will d/c and increase sinemet back to 25-250mg qid for now and monitor closely for psychosis  4  Other therapies: Individual/group/milieu therapy as appropriate   5  Social issues: Case management following to make discharge arrangement  6   Precautions: Routine q15min checks, vitals qshift; fall risk    Armani Aguilar MD  02/01/19

## 2019-02-01 NOTE — PLAN OF CARE
Problem: Ineffective Coping  Goal: Participates in unit activities  Interventions:  - Provide therapeutic environment   - Provide required programming   - Redirect inappropriate behaviors    Outcome: Progressing  Pt cooperative in self assessment interview this morning  He is focused on his desire to move more easily when functioning and plans to work with Physical Therapy  Pt reports that he has always been an outdoors man and sportsman and the thought of who he has become as a limited functioning individual,depresses him the most

## 2019-02-01 NOTE — QUICK NOTE
I was contacted by the [facility] team they regard to the patient's Parkinson's medications  As an outpatient he had been transitioned from his prior dose of Sinemet ( t i d ), to Stalevo  This occurred in late January and was followed by the onset of psychosis  Subsequently he was placed on Sinemet 5 times daily along with a separate dose of entacapone  He does follow with a neurologist who has been managing his parkinsonism  We would suggest transitioning to Sinemet  4 times daily and discontinuing the use of entacapone at this point in time  This was reviewed with behavioral health team yesterday  He can return to his outpatient neurologist for additional management subsequent discharge, however if he requires specialized evaluation in the [place] [id number] we would be happy to facilitate that  Please contact us with any questions or concerns

## 2019-02-01 NOTE — CASE MANAGEMENT
I have notified the social service office at [facility] of the expected discharge on 2/4  Await a call back with directions of how to proceed

## 2019-02-01 NOTE — PROGRESS NOTES
Pt noted ambulating with a walker  Pt refused comtan and stated that it makes him heavy minded and that only sinemet works  Pt seen in the milieu at meal time   Pt denies all s/s and medication complaint

## 2019-02-01 NOTE — PLAN OF CARE
Problem: PHYSICAL THERAPY ADULT  Goal: Performs mobility at highest level of function for planned discharge setting  See evaluation for individualized goals  Treatment/Interventions: Functional transfer training, Therapeutic exercise, Endurance training, Cognitive reorientation, Patient/family training, Equipment eval/education, Bed mobility, Gait training, Compensatory technique education, Continued evaluation, Spoke to MD, Spoke to nursing, Spoke to case management, Spoke to advanced practitioner, Family  Equipment Recommended:  (pt appears to own all necessary DME)       See flowsheet documentation for full assessment, interventions and recommendations  Outcome: Progressing  Prognosis: Good  Problem List: Decreased range of motion, Impaired balance, Decreased mobility, Decreased coordination, Decreased cognition, Impaired judgement, Decreased safety awareness, Pain  Assessment: Pt sitting in lounge in unit at time of physical therapy treatment  Therapy session today focused on gait training and turn negotiation with rolling walker  Therapist trialed use of metronome set at approx 100 bpm to improve gait speed and decrease freezing, but pt unable to match russell to metronome this session  Pt continues to have difficulty with freezing while negotiating turns and has short strides with backwards walking  May benefit from standing hip extension exercises to facilitate greater ROM with backwards ambulation  Pt requested to end session early due to 7/10 R knee pain  States that R knee pain is a chronic issue and that he has received corticosteroid injections in the past without much relief  Pt would benefit from continued skilled physical therapy while on [facility] to address mobility deficits related to Parkinson's disease and help decrease knee pain    Barriers to Discharge: None (if returning to NH)     Recommendation: Long-term skilled nursing home placement     PT - OK to Discharge: Yes (if going to NH)    See flowsheet documentation for full assessment

## 2019-02-01 NOTE — PHYSICAL THERAPY NOTE
Physical Therapy Treatment Note     02/01/19 [id number] (24 minutes)   Pain Assessment   Pain Assessment No/denies pain   Pain Type (numbness)   Pain Location Knee   Pain Orientation Right   Restrictions/Precautions   Weight Bearing Precautions Per Order No   Other Precautions Cognitive; Fall Risk;Pain   General   Chart Reviewed Yes   Response to Previous Treatment Patient with no complaints from previous session  Family/Caregiver Present No   Cognition   Overall Cognitive Status Impaired   Arousal/Participation Cooperative   Attention Attends with cues to redirect   Following Commands Follows one step commands without difficulty   Subjective   Subjective ("The comtan is making my legs worse ")   Transfers   Sit to Stand 5  Supervision   Additional items Armrests   Stand to Sit 5  Supervision   Additional items Armrests   Ambulation/Elevation   Gait pattern Narrow KEERTHI; Forward Flexion;Decreased foot clearance; Festenating;Shuffling; Short stride   Gait Assistance (CGA to S)   Additional items Verbal cues; Tactile cues   Assistive Device Rolling walker   Distance (ambulation around unit x 3, 25' x 4 fwd, 25' x 2 bkwd)   Endurance Deficit   Endurance Deficit No   Activity Tolerance   Activity Tolerance Patient limited by pain  (7/10 R knee pain)   Assessment   Prognosis Good   Problem List Decreased range of motion; Impaired balance;Decreased mobility; Decreased coordination;Decreased cognition; Impaired judgement;Decreased safety awareness;Pain   Assessment Pt sitting in lounge in unit at time of physical therapy treatment  Therapy session today focused on gait training and turn negotiation with rolling walker  Therapist trialed use of metronome set at approx 100 bpm to improve gait speed and decrease freezing, but pt unable to match russell to metronome this session  Pt continues to have difficulty with freezing while negotiating turns and has short strides with backwards walking   May benefit from standing hip extension exercises to facilitate greater ROM with backwards ambulation  Pt requested to end session early due to 7/10 R knee pain  States that R knee pain is a chronic issue and that he has received corticosteroid injections in the past without much relief  Pt would benefit from continued skilled physical therapy while on [facility] to address mobility deficits related to Parkinson's disease and help decrease knee pain  Barriers to Discharge None  (if returning to NH)   Goals   Patient Goals ("Move around better")   STG Expiration Date 02/13/19   Treatment Day 3   Plan   Treatment/Interventions Functional transfer training; Endurance training;Patient/family training;Gait training; Compensatory technique education   Progress Progressing toward goals   PT Frequency 2-3x/wk   Recommendation   Recommendation Long-term skilled nursing home placement   Equipment Recommended (pt appears to own all necessary DME)   PT - OK to Discharge Yes  (if going to NH)     Palma Cabrera, PT

## 2019-02-01 NOTE — PROGRESS NOTES
Patient refused half dose of sinemet  He wants the contreras discontinued and he would like a whole dose not a half dose of sinemet   Informed SLIM and psychiatry

## 2019-02-01 NOTE — PLAN OF CARE
Problem: PHYSICAL THERAPY ADULT  Goal: Performs mobility at highest level of function for planned discharge setting  See evaluation for individualized goals  Treatment/Interventions: Functional transfer training, Therapeutic exercise, Endurance training, Cognitive reorientation, Patient/family training, Equipment eval/education, Bed mobility, Gait training, Compensatory technique education, Continued evaluation, Spoke to MD, Spoke to nursing, Spoke to case management, Spoke to advanced practitioner, Family  Equipment Recommended:  (pt appears to own all necessary DME)       See flowsheet documentation for full assessment, interventions and recommendations  Outcome: Progressing  Prognosis: Good  Problem List: Impaired balance, Decreased mobility, Decreased coordination, Decreased cognition, Impaired judgement, Decreased safety awareness, Pain  Assessment: Pt eager to start physical therapy treatment this AM  Pt continues to present with stooped posture, rigidity, shuffling gait, and freezing episodes with transfers and ambulation  Treatment session focused on compensatory techniques to initiate movement, the use of auditory cues (rhythmic counting of steps) to increase step length with ambulation, and therex to decrease rigidity and improve posture  Pt able to ambulate approx 25' hallway with fewer, larger strides with cues from therapist and independent counting of steps  Continues to have difficulty navigating turns without freezing making him at increased risk of falling  Would benefit from continued skilled physical therapy while on [facility] to address mobility deficits and increase safety   Vitals: seated edge of bed at rest /82, HR 76 bpm; seated edge of bed post 250' x 1 ambulation /84, HR 78 bpm   Barriers to Discharge:  (none if returning to NH)     Recommendation: Long-term skilled nursing home placement     PT - OK to Discharge: Yes (if returning to SNF)    See flowsheet documentation for full assessment

## 2019-02-01 NOTE — PROGRESS NOTES
Patient calm, cooperative and compliant with medications  Patient denies all symptoms  Present in the milieu

## 2019-02-02 PROCEDURE — 99232 SBSQ HOSP IP/OBS MODERATE 35: CPT | Performed by: PHYSICIAN ASSISTANT

## 2019-02-02 PROCEDURE — 86592 SYPHILIS TEST NON-TREP QUAL: CPT | Performed by: PHYSICIAN ASSISTANT

## 2019-02-02 RX ADMIN — ASPIRIN 81 MG: 81 TABLET, COATED ORAL at 08:50

## 2019-02-02 RX ADMIN — CARBIDOPA AND LEVODOPA 1 TABLET: 25; 250 TABLET ORAL at 16:34

## 2019-02-02 RX ADMIN — CARBIDOPA AND LEVODOPA 1 TABLET: 25; 250 TABLET ORAL at 06:17

## 2019-02-02 RX ADMIN — CARBIDOPA AND LEVODOPA 1 TABLET: 25; 250 TABLET ORAL at 13:20

## 2019-02-02 RX ADMIN — CARBIDOPA AND LEVODOPA 1 TABLET: 25; 250 TABLET ORAL at 21:20

## 2019-02-02 NOTE — PROGRESS NOTES
Progress Note - Behavioral Health   Hemal Paniagua 80 y o  male MRN: [id number]  Unit/Bed#: [facility] 191-44 Encounter: [id number]    Patient seen, chart reviewed, discussed with staff  Nursing staff notes the patient has been stable and no significant change the past 24 hours  He is sleeping well and has a good appetite  Patient states that his mood is improved any currently denies any depression or anxiety  He states that he is feeling well with regards to his mood but states he is frustrated with his Parkinson's  Patient reports that his Parkinson's has been advancing over the past two years  He reports that his tremors have gradually been increasing and his gait has been increasingly difficult although he is currently ambulating well with a walker  He denies any suicidal homicidal ideation  Patient states that his auditory hallucinations have improved and he states he hears voices at times but cannot make out what they are saying  He denies any command hallucinations and denies any visual hallucinations      Behavior over the last 24 hours:  unchanged  Sleep: normal  Appetite: normal  Medication side effects: No  ROS: no complaints  /63 (BP Location: Left arm)   Pulse 67   Temp (!) 96 8 °F (36 °C) (Tympanic)   Resp 18   Ht 5' 9" (1 753 m)   Wt 71 kg (156 lb 8 4 oz)   SpO2 98%   BMI 23 11 kg/m²     Medications:   Current Facility-Administered Medications   Medication Dose Route Frequency    acetaminophen (TYLENOL) tablet 650 mg  650 mg Oral Q4H PRN    aluminum-magnesium hydroxide-simethicone (MYLANTA) 200-200-20 mg/5 mL oral suspension 30 mL  30 mL Oral Q4H PRN    aspirin (ECOTRIN LOW STRENGTH) EC tablet 81 mg  81 mg Oral Daily    benztropine (COGENTIN) injection 1 mg  1 mg Intramuscular Q6H PRN    benztropine (COGENTIN) tablet 1 mg  1 mg Oral Q6H PRN    carbidopa-levodopa (SINEMET)  mg per tablet 1 tablet  1 tablet Oral 4x Daily    chlorproMAZINE (THORAZINE) injection SOLN 25 mg 25 mg Intramuscular Q2H PRN    magnesium hydroxide (MILK OF MAGNESIA) 400 mg/5 mL oral suspension 30 mL  30 mL Oral Daily PRN    menthol-methyl salicylate (BENGAY) 60-38 % cream   Apply externally 4x Daily PRN    nicotine polacrilex (NICORETTE) gum 2 mg  2 mg Oral Q2H PRN    traZODone (DESYREL) tablet 25 mg  25 mg Oral HS PRN       Labs:   Admission on 01/28/2019   Component Date Value Ref Range Status    TSH 3RD GENERATON 01/29/2019 3 760  0 465 - 4 680 uIU/mL Final    Cholesterol 01/29/2019 221* <200 mg/dL Final    Triglycerides 01/29/2019 75  <150 mg/dL Final    HDL, Direct 01/29/2019 63* 40 - 59 mg/dL Final    LDL Calculated 01/29/2019 143* <130 mg/dL Final    Non-HDL-Chol (CHOL-HDL) 01/29/2019 158  mg/dl Final    Hemoglobin A1C 01/29/2019 5 7  4 2 - 6 3 % Final    EAG 01/29/2019 117  mg/dl Final    Platelets 31/01/7885 181  150 - 450 Thousands/uL Final    Ventricular Rate 01/29/2019 80  BPM Final    Atrial Rate 01/29/2019 80  BPM Final    AK Interval 01/29/2019 172  ms Final    QRSD Interval 01/29/2019 92  ms Final    QT Interval 01/29/2019 386  ms Final    QTC Interval 01/29/2019 445  ms Final    P Axis 01/29/2019 58  degrees Final    QRS Axis 01/29/2019 5  degrees Final    T Wave Axis 01/29/2019 20  degrees Final       Mental Status Evaluation:  Appearance:  age appropriate and casually dressed   Behavior:  calm   Speech:   Soft, garbled   Mood:  euthymic   Affect:  constricted   Thought Process:  goal directed   Thought Content:  No delusions voiced   Perceptual Disturbances: Residual auditory hallucinations without commands, no visual hallucinations   Risk Potential: Suicidal Ideations none, Homicidal Ideations none and Potential for Aggression No   Sensorium:  person, place and time/date   Cognition:  grossly intact   Consciousness:  alert and awake    Attention: attention span and concentration were age appropriate   Insight:  fair   Judgment: fair   Gait/Station: Ambulates with walker   Motor Activity: abnormal movement noted  Bilateral upper extremity tremor     Progress Toward Goals:  Improving and approaching baseline    Assessment/Plan   Principal Problem:    Substance or medication-induced psychotic disorder (HCC)  Active Problems:    Parkinson disease ([address] )    Suicidal ideation      Recommended Treatment:   Continue with same treatment plan and monitor      Continue with group therapy, milieu therapy and occupational therapy  Risks, benefits and possible side effects of Medications:   Risks, benefits, and possible side effects of medications explained to patient and patient verbalizes understanding  Counseling / Coordination of Care  Total floor / unit time spent today 25 minutes  Greater than 50% of total time was spent with the patient and / or family counseling and / or coordination of care  A description of the counseling / coordination of care:  Medication management, chart review, patient interview

## 2019-02-02 NOTE — PROGRESS NOTES
Patient present in milieu  Pleasant and social with staff and peers  Compliant with medications and meals  Denies SI/HI at this time  Will continue to monitor

## 2019-02-02 NOTE — PROGRESS NOTES
Patient reports being anxious which he attributes to his parkinson's and describes as chronic  Otherwise he denies symptoms  Patient is calm and cooperative, out in milieu interacts freely with others

## 2019-02-03 PROCEDURE — 99232 SBSQ HOSP IP/OBS MODERATE 35: CPT | Performed by: PSYCHIATRY & NEUROLOGY

## 2019-02-03 RX ADMIN — ACETAMINOPHEN 650 MG: 325 TABLET ORAL at 09:47

## 2019-02-03 RX ADMIN — CARBIDOPA AND LEVODOPA 1 TABLET: 25; 250 TABLET ORAL at 20:54

## 2019-02-03 RX ADMIN — CARBIDOPA AND LEVODOPA 1 TABLET: 25; 250 TABLET ORAL at 06:04

## 2019-02-03 RX ADMIN — CARBIDOPA AND LEVODOPA 1 TABLET: 25; 250 TABLET ORAL at 14:18

## 2019-02-03 RX ADMIN — ASPIRIN 81 MG: 81 TABLET, COATED ORAL at 08:20

## 2019-02-03 RX ADMIN — CARBIDOPA AND LEVODOPA 1 TABLET: 25; 250 TABLET ORAL at 10:52

## 2019-02-03 NOTE — PROGRESS NOTES
C/O"I am feeling much better today slept well and     Report from staff regarding this patient received and record reviewed  prior to seeing this patient   Behavior over the last 24 hours:  Patient seen for schizoaffective disorder, he also has parkinsonism, uses walker for gait,  Sleep:  Good  Appetite:  Okay  Medication side effects:  Denied  ROS:  Improved  Mental Status Evaluation:  Appearance:  Dressed appropraitely   Behavior:  cooperative   Speech:  normal   Mood:  euthymic   Affect:  appropriate     Thought Process:  Goal directed   Thought Content:  normal   Perceptual Disturbances: Denied AV hallucination   Risk Potential: NO SHANNON    Sensorium:  normal   Cognition:  intact   Consciousness:  Alert, OX3   Attention: Fair   Insight:  fair   Judgment: fair   Gait/Station: With in normal range   Motor Activity: With in normal range     Progress Toward Goals: working on current treatment goals, no changes  Made in treatment plan   Recommended Treatment: Continue with group therapy, milieu therapy and occupational therapy  Risks, benefits and possible side effects of Medications:   Risks, benefits, and possible side effects of medications explained to patient and patient verbalizes understanding        Medications:   current meds:   Current Facility-Administered Medications   Medication Dose Route Frequency    acetaminophen (TYLENOL) tablet 650 mg  650 mg Oral Q4H PRN    aluminum-magnesium hydroxide-simethicone (MYLANTA) 200-200-20 mg/5 mL oral suspension 30 mL  30 mL Oral Q4H PRN    aspirin (ECOTRIN LOW STRENGTH) EC tablet 81 mg  81 mg Oral Daily    benztropine (COGENTIN) injection 1 mg  1 mg Intramuscular Q6H PRN    benztropine (COGENTIN) tablet 1 mg  1 mg Oral Q6H PRN    carbidopa-levodopa (SINEMET)  mg per tablet 1 tablet  1 tablet Oral 4x Daily    chlorproMAZINE (THORAZINE) injection SOLN 25 mg  25 mg Intramuscular Q2H PRN    magnesium hydroxide (MILK OF MAGNESIA) 400 mg/5 mL oral suspension 30 mL  30 mL Oral Daily PRN    menthol-methyl salicylate (BENGAY) 96-22 % cream   Apply externally 4x Daily PRN    nicotine polacrilex (NICORETTE) gum 2 mg  2 mg Oral Q2H PRN    traZODone (DESYREL) tablet 25 mg  25 mg Oral HS PRN     Labs: NA    Assessment, Diagnosis  and Plan: continue with current meds and goals,improving     Counseling / Coordination of Care  Total floor / unit time spent today20 minutes  minutes  Greater than 50% of total time was spent with the patient and / or family counseling and / or coordination of care  A description of the counseling / coordination of care:   Follow up with the treatment team    Raquel Torre MD

## 2019-02-03 NOTE — PROGRESS NOTES
Patient complains of chronic knee pain and was given PRN Tylenol, also complains of feeling "fogged"  He requested "a shot of penicillin" to "clear up my brain"  Otherwise he denies symptoms  Patient is calm and cooperative, though he does refuse to use his walker at times  Currently he is watching TV in milieu

## 2019-02-03 NOTE — PROGRESS NOTES
Patient has been preoccupied with receiving "penicillin shots"  Stating he would pay this writer for them  Patient asked that this writer give him "a whole bunch" of his sinemet at once so he could cure his parkinsonism  Nursing attempted to educate and redirect with no effectiveness  Patient continues to believe he needs penicillin and he can cure his disease by an overdose of Sinemet

## 2019-02-03 NOTE — PROGRESS NOTES
Pt calm, cooperative, pleasant on conversation  Pt mostly seclusive to room with limited interaction with peers  Denies all s/s   Preoccupied with pending results of his RPR blood test

## 2019-02-04 VITALS
BODY MASS INDEX: 23.18 KG/M2 | OXYGEN SATURATION: 97 % | TEMPERATURE: 98.2 F | HEART RATE: 64 BPM | SYSTOLIC BLOOD PRESSURE: 133 MMHG | DIASTOLIC BLOOD PRESSURE: 60 MMHG | WEIGHT: 156.53 LBS | RESPIRATION RATE: 16 BRPM | HEIGHT: 69 IN

## 2019-02-04 LAB — RPR SER QL: NORMAL

## 2019-02-04 PROCEDURE — 99238 HOSP IP/OBS DSCHRG MGMT 30/<: CPT | Performed by: PSYCHIATRY & NEUROLOGY

## 2019-02-04 RX ORDER — CARBIDOPA/LEVODOPA 25MG-250MG
1 TABLET ORAL 4 TIMES DAILY
Refills: 0
Start: 2019-02-04

## 2019-02-04 RX ADMIN — CARBIDOPA AND LEVODOPA 1 TABLET: 25; 250 TABLET ORAL at 06:12

## 2019-02-04 RX ADMIN — ASPIRIN 81 MG: 81 TABLET, COATED ORAL at 08:41

## 2019-02-04 RX ADMIN — CARBIDOPA AND LEVODOPA 1 TABLET: 25; 250 TABLET ORAL at 10:52

## 2019-02-04 NOTE — PLAN OF CARE
Problem: Ineffective Coping  Goal: Participates in unit activities  Interventions:  - Provide therapeutic environment   - Provide required programming   - Redirect inappropriate behaviors    Outcome: Adequate for Discharge  Pt reported feeling ready for discharge  He demonstrated ability to focus to task in morning groups yet desire to leave the hospital Tent  discharge this afternoon
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
## 2019-02-04 NOTE — PLAN OF CARE
Anxiety     Anxiety is at manageable level Adequate for Discharge        DISCHARGE PLANNING     Discharge to home or other facility with appropriate resources Adequate for Discharge        Ineffective Coping     Participates in unit activities Adequate for Discharge        Prexisting or High Potential for Compromised Skin Integrity     Skin integrity is maintained or improved Adequate for Discharge        Risk for Self Injury/Neglect     Treatment Goal: Remain safe during length of stay, learn and adopt new coping skills, and be free of self-injurious ideation, impulses and acts at the time of discharge Adequate for Discharge

## 2019-02-04 NOTE — PROGRESS NOTES
Pt was just picked up by ambulance for discharge back to [facility]  Pt was cooperative during discharge,denies any issues  All belongings,discharge instruction and prescription sent with pt

## 2019-02-04 NOTE — PROGRESS NOTES
Alert,awake and visible on the unit  Pleasant and cooperative with staff  Med compliant  Pt denies any thoughts of self harm,denies any depression at this time  Pt scheduled for discharge today back to [facility]  Report called to facility  No complaints at this time  Will continue to monitor closely

## 2019-02-04 NOTE — PROGRESS NOTES
Pt has made no remarks regarding penicillin this evening  Calm and cooperative, watching television all evening in dayroom  Denies all s/s  Medication compliant